# Patient Record
(demographics unavailable — no encounter records)

---

## 2021-09-20 NOTE — NUR
RN ADMISSION NOTES,

RECEIVED 90 YO FEMALE ADMITTED FROM ER DEPARTMENT VIA STRETCHER, ACCOMPANIED BY 2 NURSES, 
UNDER MEDICAL SERVICES OF DR MTZ, UNDER MEDICAL EPIC GROUP SERVICES, ADMITTING DX AFIB 
WITH RVR, PATIENT A/A/O X4 ABLE TO MAKE DECISIONS KNOWN Mongolian SPEAKING, NO SOB/ACUTE 
DISTRESS NOTED, PATIENT ON AMIODARONE DRIP INFUSING AT 1MG/HR TILL MIDNIGHT, AFTER THAT WILL 
CONT AT 0.5MG/HR FOR THE REST OF THE 24 ADMINISTRATION (18HRS), NO ADVERSE SIDE EFFECT 
NOTES, PATIENT TOLERATED WELL, IV SITE IN LEFT AC PATENT AND INTACT, AFEBRILE, NOTED WITH 
SACRAL REDNESS, CONTINENT, AMBULATORY WITH ASSISTANCE, CLEAN AND DRY, ATTACHED TO MONITOR 
AND PATIENT CONTINUE AFIB IN TELE MONITOR WITH HR FLUCTUATING 110-130, BED LOCKED AND LOW 
POSITION, CALL LIGHT W/I REACH, SON IN LAW [employer] AT BEDSIDE, WILL CONTINUE TO MONITOR 
CLOSELY.

## 2021-09-20 NOTE — NUR
The patient [id number], from home, c/o palpitation 45 mins PTA, denies pain. The 
patient denies having in pain. In room air and denies SOB. Respiration regular 
and unlabored. Attached to the monitor. Warm blanket provided for comfort. Will 
continue to monitor the patient.

## 2021-09-20 NOTE — NUR
RN NOTES,

 AMIODARONE DRIP DECREASED FROM 1MG TO 0.5MG AT THIS TIME, WILL CONTINUE TO MONITOR CLOSELY.

## 2021-09-20 NOTE — NUR
RN NOTES,

PATIENT C/O ANXIETY, AND PER HER AND SON IN LAW MOST OF THE TIME SHE SUFFER OF PERSISTENT 
ANXIETY, INFORMED MD AND HE REPLIED WITH ATIVAN 1MG PO Q6H PRN FOR ANXIETY, ORDER NOTED AND 
CARRIED OUT.

## 2021-09-20 NOTE — NUR
STARTED AMIODARONE 150 MG IV BOLUSING OVER 15 MIN. NO SIDE EFFECTS NOTED AT 
THIS TIME. WILL CONTINUE TO MONITOR THE PATIENT.

## 2021-09-21 NOTE — NUR
RN OPENING NOTES;



RECEIVED PT A/OX3, PT IS AMBULATORY BUT HAS EPISODES OF CONFUSION. PT ON AMIODERONE DRIP ON 
l AC 20G. REDNESS ON PT SACRAL NOTED. SAFETY MEASURES RENDERED, BED IN LOWEST POSITION, 
LOCKED, WITH CALL LIGHT WITHIN REACH. WILL CONTINUE TO MONITOR.

## 2021-09-21 NOTE — NUR
RN NOTES,



PATIENT NOTED CONVERSION FROM AFIB TO NSR WITH HR IN 70S AT THIS TIME, WILL CONTINUE TO 
MONITOR CLOSELY, MD AWARE.

## 2021-09-21 NOTE — NUR
RN CLOSING NOTES;



PT A/OX3, PT IS Algerian SPEAKING. NO SOB, OR DISTRESS NOTED ON SHIFT. AMIO. DRIP DONE. PT 
TOLERATING WELL, WITH V/S WNL. HR RANGES 76-78. BLOOD CULTURES STILL PENDING. HOLD LASIX PER 
MD NOTES. NO SIGNIFICANT CHANGES DURING SHIFT. SAFETY MEASURES RENDERED, BED IN LOWEST POS. 
LOCKED, WITH CALL LIGHT WITHIN REACH. ENDORSED TO NIGHT SHIFT RN IN STABLE CONDITION.

## 2021-09-21 NOTE — NUR
RN NOTES,

PATIENT CONTINUE ON AMIODARONE 0.5MG FOR THE REST OF THE THERAPY PER ORDER, NSR IN THE TELE  
MONITOR WITH HR HIGH 60S-70S AT THIS TIME, IV IN LEFT AC PATENT AND INTACT,  NO SIGNS OF 
INFILTRATION NOTED, ATIVAN PRN LAST NIGHT AND HELPED HER TO CALM DOWN AND  HAVE A GOOD 
SLEEP, BED LOCKED AND LOWEST POSITION, WILL ENDORSE CONTINUITY OF CARE TO ONCOMING NURSE.

## 2021-09-22 NOTE — NUR
RN OPENING NOTE



PT RESTING IN BED ASLEEP. ON 2L NC O2 WITH NO SOB OR RESPIRATORY DISTRESS PRESENT. A/O X4 
AND Irish SPEAKING. ON CARDIAC MONITOR. NO EDEMA PRESENT. SELF AMBULATORY WITH STANDBY 
ASSIST. COMMODE AT BEDSIDE. SACRAL REDNESS PRESENT. IV PRESENT ON L AC 20G AND FLUSHES WELL. 
SALINE LOCKED. LABS AND ORDERS REVIEWED. SAFETY MEASURES IN PLACE. SIDE RAILS RAISED. BED 
LOWERED. CALL LIGHT WITHIN REACH. WILL CONTINUE TO MONITOR.

## 2021-09-22 NOTE — NUR
RN DISCHARGE NOTE



PT DISCHARGED HOME. [facility] EDUCATION UTILIZED AND GIVEN TO PT. F/U WITH PCP AND DR MCCABE 
IN 1 WEEK. PT GIVEN CARD FOR DR MCCABE. SKIN ASSESSMENT DONE, PICTURES IN CHART. NEW 
PRESCRIPTIONS VERIFIED WITH PT. IV LINES REMOVED. ID BANDS REMOVED. PT TRANSPORTED OUT OF 
HOSPITAL VIA PRIVATE CAR ACCOMPANIED BY BROOKE (SON).